# Patient Record
Sex: MALE | Race: BLACK OR AFRICAN AMERICAN | NOT HISPANIC OR LATINO | Employment: STUDENT | URBAN - METROPOLITAN AREA
[De-identification: names, ages, dates, MRNs, and addresses within clinical notes are randomized per-mention and may not be internally consistent; named-entity substitution may affect disease eponyms.]

---
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
## 2018-01-01 ENCOUNTER — APPOINTMENT (OUTPATIENT)
Dept: GENERAL RADIOLOGY | Facility: HOSPITAL | Age: 18
End: 2018-01-01

## 2018-01-01 ENCOUNTER — HOSPITAL ENCOUNTER (EMERGENCY)
Facility: HOSPITAL | Age: 18
Discharge: HOME | End: 2018-10-15
Attending: EMERGENCY MEDICINE | Admitting: NURSE PRACTITIONER

## 2018-01-01 ENCOUNTER — OFFICE VISIT (OUTPATIENT)
Dept: RETAIL CLINIC | Facility: CLINIC | Age: 18
End: 2018-01-01

## 2018-01-01 ENCOUNTER — TELEPHONE (OUTPATIENT)
Dept: URGENT CARE | Facility: CLINIC | Age: 18
End: 2018-01-01

## 2018-01-01 ENCOUNTER — HOSPITAL ENCOUNTER (EMERGENCY)
Facility: HOSPITAL | Age: 18
Discharge: HOME | End: 2018-07-08
Attending: EMERGENCY MEDICINE | Admitting: NURSE PRACTITIONER

## 2018-01-01 ENCOUNTER — OFFICE VISIT (OUTPATIENT)
Dept: CARDIOLOGY | Facility: CLINIC | Age: 18
End: 2018-01-01

## 2018-01-01 ENCOUNTER — HOSPITAL ENCOUNTER (EMERGENCY)
Facility: HOSPITAL | Age: 18
Discharge: HOME | End: 2018-12-18
Attending: EMERGENCY MEDICINE | Admitting: EMERGENCY MEDICINE

## 2018-01-01 ENCOUNTER — APPOINTMENT (OUTPATIENT)
Dept: CT IMAGING | Facility: HOSPITAL | Age: 18
End: 2018-01-01

## 2018-01-01 ENCOUNTER — HOSPITAL ENCOUNTER (EMERGENCY)
Facility: HOSPITAL | Age: 18
Discharge: HOME | End: 2018-07-24
Attending: EMERGENCY MEDICINE | Admitting: EMERGENCY MEDICINE

## 2018-01-01 VITALS
SYSTOLIC BLOOD PRESSURE: 132 MMHG | BODY MASS INDEX: 28.44 KG/M2 | DIASTOLIC BLOOD PRESSURE: 82 MMHG | OXYGEN SATURATION: 98 % | HEIGHT: 72 IN | HEART RATE: 58 BPM | TEMPERATURE: 98.5 F | RESPIRATION RATE: 16 BRPM | WEIGHT: 210 LBS

## 2018-01-01 VITALS
DIASTOLIC BLOOD PRESSURE: 80 MMHG | HEART RATE: 64 BPM | WEIGHT: 209.6 LBS | SYSTOLIC BLOOD PRESSURE: 110 MMHG | HEIGHT: 72 IN | BODY MASS INDEX: 28.39 KG/M2

## 2018-01-01 VITALS
DIASTOLIC BLOOD PRESSURE: 88 MMHG | HEART RATE: 78 BPM | SYSTOLIC BLOOD PRESSURE: 126 MMHG | OXYGEN SATURATION: 99 % | WEIGHT: 216 LBS | HEIGHT: 72 IN | TEMPERATURE: 98.3 F | BODY MASS INDEX: 29.26 KG/M2

## 2018-01-01 VITALS
BODY MASS INDEX: 28.44 KG/M2 | HEIGHT: 72 IN | TEMPERATURE: 97.4 F | WEIGHT: 210 LBS | DIASTOLIC BLOOD PRESSURE: 99 MMHG | HEART RATE: 53 BPM | SYSTOLIC BLOOD PRESSURE: 125 MMHG | RESPIRATION RATE: 16 BRPM | OXYGEN SATURATION: 94 %

## 2018-01-01 VITALS
HEIGHT: 71 IN | TEMPERATURE: 98.1 F | HEART RATE: 63 BPM | BODY MASS INDEX: 29.4 KG/M2 | RESPIRATION RATE: 18 BRPM | SYSTOLIC BLOOD PRESSURE: 142 MMHG | OXYGEN SATURATION: 100 % | WEIGHT: 210 LBS | DIASTOLIC BLOOD PRESSURE: 97 MMHG

## 2018-01-01 VITALS
DIASTOLIC BLOOD PRESSURE: 80 MMHG | HEART RATE: 55 BPM | WEIGHT: 210 LBS | RESPIRATION RATE: 16 BRPM | HEIGHT: 72 IN | OXYGEN SATURATION: 98 % | TEMPERATURE: 97.7 F | BODY MASS INDEX: 28.44 KG/M2 | SYSTOLIC BLOOD PRESSURE: 133 MMHG

## 2018-01-01 DIAGNOSIS — R55 NEAR SYNCOPE: ICD-10-CM

## 2018-01-01 DIAGNOSIS — M79.18 MUSCLE PAIN, MYOFACIAL: ICD-10-CM

## 2018-01-01 DIAGNOSIS — H60.332 ACUTE SWIMMER'S EAR OF LEFT SIDE: Primary | ICD-10-CM

## 2018-01-01 DIAGNOSIS — R77.8 TROPONIN LEVEL ELEVATED: ICD-10-CM

## 2018-01-01 DIAGNOSIS — R77.8 ELEVATED TROPONIN: ICD-10-CM

## 2018-01-01 DIAGNOSIS — R74.8 ELEVATED CPK: ICD-10-CM

## 2018-01-01 DIAGNOSIS — I51.4 CHRONIC MYOCARDITIS, UNSPECIFIED MYOCARDITIS TYPE (HCC): Primary | ICD-10-CM

## 2018-01-01 DIAGNOSIS — R07.9 CHEST PAIN, UNSPECIFIED TYPE: Primary | ICD-10-CM

## 2018-01-01 DIAGNOSIS — R07.89 ATYPICAL CHEST PAIN: Primary | ICD-10-CM

## 2018-01-01 DIAGNOSIS — I51.4 CHRONIC MYOCARDITIS, UNSPECIFIED MYOCARDITIS TYPE (HCC): ICD-10-CM

## 2018-01-01 DIAGNOSIS — R74.8 ELEVATED CK: ICD-10-CM

## 2018-01-01 DIAGNOSIS — R55 SYNCOPE AND COLLAPSE: ICD-10-CM

## 2018-01-01 LAB
ALBUMIN SERPL-MCNC: 4.38 G/DL (ref 3.2–4.8)
ALBUMIN SERPL-MCNC: 4.49 G/DL (ref 3.2–4.8)
ALBUMIN SERPL-MCNC: 4.6 G/DL (ref 3.2–4.8)
ALBUMIN/GLOB SERPL: 1.7 G/DL (ref 1.5–2.5)
ALBUMIN/GLOB SERPL: 1.7 G/DL (ref 1.5–2.5)
ALBUMIN/GLOB SERPL: 1.8 G/DL (ref 1.5–2.5)
ALP SERPL-CCNC: 143 U/L (ref 25–100)
ALP SERPL-CCNC: 148 U/L (ref 25–100)
ALP SERPL-CCNC: 153 U/L (ref 25–100)
ALT SERPL W P-5'-P-CCNC: 35 U/L (ref 7–40)
ALT SERPL W P-5'-P-CCNC: 66 U/L (ref 7–40)
ALT SERPL W P-5'-P-CCNC: 75 U/L (ref 7–40)
AMPHET+METHAMPHET UR QL: NEGATIVE
AMPHETAMINES UR QL: NEGATIVE
ANION GAP SERPL CALCULATED.3IONS-SCNC: 5 MMOL/L (ref 3–11)
ANION GAP SERPL CALCULATED.3IONS-SCNC: 5 MMOL/L (ref 3–11)
ANION GAP SERPL CALCULATED.3IONS-SCNC: 7 MMOL/L (ref 3–11)
AST SERPL-CCNC: 26 U/L (ref 0–33)
AST SERPL-CCNC: 33 U/L (ref 0–33)
AST SERPL-CCNC: 39 U/L (ref 0–33)
BARBITURATES UR QL SCN: NEGATIVE
BASOPHILS # BLD AUTO: 0.01 10*3/MM3 (ref 0–0.2)
BASOPHILS # BLD AUTO: 0.01 10*3/MM3 (ref 0–0.2)
BASOPHILS # BLD AUTO: 0.02 10*3/MM3 (ref 0–0.2)
BASOPHILS NFR BLD AUTO: 0.1 % (ref 0–1)
BASOPHILS NFR BLD AUTO: 0.2 % (ref 0–1)
BASOPHILS NFR BLD AUTO: 0.4 % (ref 0–1)
BENZODIAZ UR QL SCN: NEGATIVE
BILIRUB SERPL-MCNC: 0.5 MG/DL (ref 0.3–1.2)
BILIRUB SERPL-MCNC: 0.6 MG/DL (ref 0.3–1.2)
BILIRUB SERPL-MCNC: 0.7 MG/DL (ref 0.3–1.2)
BILIRUB UR QL STRIP: NEGATIVE
BNP SERPL-MCNC: <2 PG/ML (ref 0–100)
BUN BLD-MCNC: 10 MG/DL (ref 9–23)
BUN BLD-MCNC: 11 MG/DL (ref 9–23)
BUN BLD-MCNC: 11 MG/DL (ref 9–23)
BUN/CREAT SERPL: 10.6 (ref 7–25)
BUN/CREAT SERPL: 11.1 (ref 7–25)
BUN/CREAT SERPL: 8.8 (ref 7–25)
BUPRENORPHINE SERPL-MCNC: NEGATIVE NG/ML
CALCIUM SPEC-SCNC: 9.4 MG/DL (ref 8.7–10.4)
CALCIUM SPEC-SCNC: 9.7 MG/DL (ref 8.7–10.4)
CALCIUM SPEC-SCNC: 9.8 MG/DL (ref 8.7–10.4)
CANNABINOIDS SERPL QL: NEGATIVE
CHLORIDE SERPL-SCNC: 105 MMOL/L (ref 99–109)
CHLORIDE SERPL-SCNC: 107 MMOL/L (ref 99–109)
CHLORIDE SERPL-SCNC: 107 MMOL/L (ref 99–109)
CK SERPL-CCNC: 226 U/L (ref 26–174)
CK SERPL-CCNC: 297 U/L (ref 26–174)
CLARITY UR: CLEAR
CO2 SERPL-SCNC: 26 MMOL/L (ref 20–31)
CO2 SERPL-SCNC: 28 MMOL/L (ref 20–31)
CO2 SERPL-SCNC: 29 MMOL/L (ref 20–31)
COCAINE UR QL: NEGATIVE
COLOR UR: YELLOW
CREAT BLD-MCNC: 0.99 MG/DL (ref 0.6–1.3)
CREAT BLD-MCNC: 1.04 MG/DL (ref 0.6–1.3)
CREAT BLD-MCNC: 1.14 MG/DL (ref 0.6–1.3)
CRP SERPL-MCNC: 0.08 MG/DL (ref 0–1)
CRP SERPL-MCNC: 0.22 MG/DL (ref 0–1)
D DIMER PPP FEU-MCNC: 0.56 MG/L (FEU) (ref 0–0.5)
DEPRECATED RDW RBC AUTO: 38.2 FL (ref 37–54)
DEPRECATED RDW RBC AUTO: 38.4 FL (ref 37–54)
DEPRECATED RDW RBC AUTO: 38.7 FL (ref 37–54)
EOSINOPHIL # BLD AUTO: 0.04 10*3/MM3 (ref 0–0.3)
EOSINOPHIL # BLD AUTO: 0.06 10*3/MM3 (ref 0–0.3)
EOSINOPHIL # BLD AUTO: 0.06 10*3/MM3 (ref 0–0.3)
EOSINOPHIL NFR BLD AUTO: 0.5 % (ref 0–3)
EOSINOPHIL NFR BLD AUTO: 0.9 % (ref 0–3)
EOSINOPHIL NFR BLD AUTO: 1.3 % (ref 0–3)
ERYTHROCYTE [DISTWIDTH] IN BLOOD BY AUTOMATED COUNT: 12.8 % (ref 11.3–14.5)
ERYTHROCYTE [DISTWIDTH] IN BLOOD BY AUTOMATED COUNT: 12.8 % (ref 11.3–14.5)
ERYTHROCYTE [DISTWIDTH] IN BLOOD BY AUTOMATED COUNT: 12.9 % (ref 11.3–14.5)
ERYTHROCYTE [SEDIMENTATION RATE] IN BLOOD: 3 MM/HR (ref 0–15)
ERYTHROCYTE [SEDIMENTATION RATE] IN BLOOD: <1 MM/HR (ref 0–15)
GFR SERPL CREATININE-BSD FRML MDRD: 112 ML/MIN/1.73
GFR SERPL CREATININE-BSD FRML MDRD: 117 ML/MIN/1.73
GFR SERPL CREATININE-BSD FRML MDRD: 99 ML/MIN/1.73
GLOBULIN UR ELPH-MCNC: 2.5 GM/DL
GLOBULIN UR ELPH-MCNC: 2.6 GM/DL
GLOBULIN UR ELPH-MCNC: 2.7 GM/DL
GLUCOSE BLD-MCNC: 82 MG/DL (ref 70–100)
GLUCOSE BLD-MCNC: 87 MG/DL (ref 70–100)
GLUCOSE BLD-MCNC: 98 MG/DL (ref 70–100)
GLUCOSE UR STRIP-MCNC: NEGATIVE MG/DL
HCT VFR BLD AUTO: 42.5 % (ref 38.9–50.9)
HCT VFR BLD AUTO: 44.1 % (ref 38.9–50.9)
HCT VFR BLD AUTO: 45.5 % (ref 38.9–50.9)
HGB BLD-MCNC: 14.7 G/DL (ref 13.1–17.5)
HGB BLD-MCNC: 15 G/DL (ref 13.1–17.5)
HGB BLD-MCNC: 15.6 G/DL (ref 13.1–17.5)
HGB UR QL STRIP.AUTO: NEGATIVE
HIV1+2 AB SER QL: NORMAL
HOLD SPECIMEN: NORMAL
IMM GRANULOCYTES # BLD: 0 10*3/MM3 (ref 0–0.03)
IMM GRANULOCYTES # BLD: 0.01 10*3/MM3 (ref 0–0.03)
IMM GRANULOCYTES # BLD: 0.02 10*3/MM3 (ref 0–0.03)
IMM GRANULOCYTES NFR BLD: 0 % (ref 0–0.6)
IMM GRANULOCYTES NFR BLD: 0.2 % (ref 0–0.6)
IMM GRANULOCYTES NFR BLD: 0.3 % (ref 0–0.6)
KETONES UR QL STRIP: NEGATIVE
LEUKOCYTE ESTERASE UR QL STRIP.AUTO: NEGATIVE
LIPASE SERPL-CCNC: 29 U/L (ref 6–51)
LIPASE SERPL-CCNC: 30 U/L (ref 6–51)
LYMPHOCYTES # BLD AUTO: 1.41 10*3/MM3 (ref 0.6–4.8)
LYMPHOCYTES # BLD AUTO: 2.05 10*3/MM3 (ref 0.6–4.8)
LYMPHOCYTES # BLD AUTO: 2.27 10*3/MM3 (ref 0.6–4.8)
LYMPHOCYTES NFR BLD AUTO: 29.3 % (ref 24–44)
LYMPHOCYTES NFR BLD AUTO: 30.7 % (ref 24–44)
LYMPHOCYTES NFR BLD AUTO: 31.6 % (ref 24–44)
MCH RBC QN AUTO: 28.1 PG (ref 27–31)
MCHC RBC AUTO-ENTMCNC: 34 G/DL (ref 32–36)
MCHC RBC AUTO-ENTMCNC: 34.3 G/DL (ref 32–36)
MCHC RBC AUTO-ENTMCNC: 34.6 G/DL (ref 32–36)
MCV RBC AUTO: 81.1 FL (ref 80–99)
MCV RBC AUTO: 82 FL (ref 80–99)
MCV RBC AUTO: 82.6 FL (ref 80–99)
METHADONE UR QL SCN: NEGATIVE
MONOCYTES # BLD AUTO: 0.27 10*3/MM3 (ref 0–1)
MONOCYTES # BLD AUTO: 0.3 10*3/MM3 (ref 0–1)
MONOCYTES # BLD AUTO: 0.36 10*3/MM3 (ref 0–1)
MONOCYTES NFR BLD AUTO: 4.6 % (ref 0–12)
MONOCYTES NFR BLD AUTO: 4.6 % (ref 0–12)
MONOCYTES NFR BLD AUTO: 5.9 % (ref 0–12)
NEUTROPHILS # BLD AUTO: 2.83 10*3/MM3 (ref 1.5–8.3)
NEUTROPHILS # BLD AUTO: 4.06 10*3/MM3 (ref 1.5–8.3)
NEUTROPHILS # BLD AUTO: 5.08 10*3/MM3 (ref 1.5–8.3)
NEUTROPHILS NFR BLD AUTO: 61.5 % (ref 41–71)
NEUTROPHILS NFR BLD AUTO: 62.7 % (ref 41–71)
NEUTROPHILS NFR BLD AUTO: 65.5 % (ref 41–71)
NITRITE UR QL STRIP: NEGATIVE
OPIATES UR QL: NEGATIVE
OXYCODONE UR QL SCN: NEGATIVE
PCP UR QL SCN: NEGATIVE
PH UR STRIP.AUTO: 6.5 [PH] (ref 5–8)
PLATELET # BLD AUTO: 182 10*3/MM3 (ref 150–450)
PLATELET # BLD AUTO: 201 10*3/MM3 (ref 150–450)
PLATELET # BLD AUTO: 220 10*3/MM3 (ref 150–450)
PMV BLD AUTO: 8.6 FL (ref 6–12)
PMV BLD AUTO: 8.7 FL (ref 6–12)
PMV BLD AUTO: 8.9 FL (ref 6–12)
POTASSIUM BLD-SCNC: 3.5 MMOL/L (ref 3.5–5.5)
POTASSIUM BLD-SCNC: 3.7 MMOL/L (ref 3.5–5.5)
POTASSIUM BLD-SCNC: 4.1 MMOL/L (ref 3.5–5.5)
PROPOXYPH UR QL: NEGATIVE
PROT SERPL-MCNC: 7 G/DL (ref 5.7–8.2)
PROT SERPL-MCNC: 7.1 G/DL (ref 5.7–8.2)
PROT SERPL-MCNC: 7.2 G/DL (ref 5.7–8.2)
PROT UR QL STRIP: NEGATIVE
RBC # BLD AUTO: 5.24 10*6/MM3 (ref 4.2–5.76)
RBC # BLD AUTO: 5.34 10*6/MM3 (ref 4.2–5.76)
RBC # BLD AUTO: 5.55 10*6/MM3 (ref 4.2–5.76)
SODIUM BLD-SCNC: 139 MMOL/L (ref 132–146)
SODIUM BLD-SCNC: 140 MMOL/L (ref 132–146)
SODIUM BLD-SCNC: 140 MMOL/L (ref 132–146)
SP GR UR STRIP: 1.05 (ref 1–1.03)
TRICYCLICS UR QL SCN: NEGATIVE
TROPONIN I SERPL-MCNC: 0.34 NG/ML (ref 0–0.07)
TROPONIN I SERPL-MCNC: 0.34 NG/ML (ref 0–0.07)
TROPONIN I SERPL-MCNC: 0.38 NG/ML (ref 0–0.07)
TROPONIN I SERPL-MCNC: 0.41 NG/ML (ref 0–0.07)
TROPONIN I SERPL-MCNC: 0.42 NG/ML (ref 0–0.07)
TSH SERPL DL<=0.05 MIU/L-ACNC: 0.87 MIU/ML (ref 0.35–5.35)
UROBILINOGEN UR QL STRIP: ABNORMAL
WBC NRBC COR # BLD: 4.6 10*3/MM3 (ref 4.5–13.5)
WBC NRBC COR # BLD: 6.48 10*3/MM3 (ref 4.5–13.5)
WBC NRBC COR # BLD: 7.76 10*3/MM3 (ref 4.5–13.5)
WHOLE BLOOD HOLD SPECIMEN: NORMAL

## 2018-01-01 PROCEDURE — 85652 RBC SED RATE AUTOMATED: CPT | Performed by: NURSE PRACTITIONER

## 2018-01-01 PROCEDURE — 93005 ELECTROCARDIOGRAM TRACING: CPT | Performed by: EMERGENCY MEDICINE

## 2018-01-01 PROCEDURE — 83880 ASSAY OF NATRIURETIC PEPTIDE: CPT | Performed by: NURSE PRACTITIONER

## 2018-01-01 PROCEDURE — 25010000002 KETOROLAC TROMETHAMINE PER 15 MG: Performed by: EMERGENCY MEDICINE

## 2018-01-01 PROCEDURE — 99214 OFFICE O/P EST MOD 30 MIN: CPT | Performed by: INTERNAL MEDICINE

## 2018-01-01 PROCEDURE — 80306 DRUG TEST PRSMV INSTRMNT: CPT | Performed by: NURSE PRACTITIONER

## 2018-01-01 PROCEDURE — 99284 EMERGENCY DEPT VISIT MOD MDM: CPT

## 2018-01-01 PROCEDURE — 83690 ASSAY OF LIPASE: CPT | Performed by: EMERGENCY MEDICINE

## 2018-01-01 PROCEDURE — 86140 C-REACTIVE PROTEIN: CPT | Performed by: NURSE PRACTITIONER

## 2018-01-01 PROCEDURE — 82550 ASSAY OF CK (CPK): CPT | Performed by: EMERGENCY MEDICINE

## 2018-01-01 PROCEDURE — 71045 X-RAY EXAM CHEST 1 VIEW: CPT

## 2018-01-01 PROCEDURE — 80053 COMPREHEN METABOLIC PANEL: CPT | Performed by: NURSE PRACTITIONER

## 2018-01-01 PROCEDURE — 84443 ASSAY THYROID STIM HORMONE: CPT | Performed by: EMERGENCY MEDICINE

## 2018-01-01 PROCEDURE — 82550 ASSAY OF CK (CPK): CPT | Performed by: NURSE PRACTITIONER

## 2018-01-01 PROCEDURE — 81003 URINALYSIS AUTO W/O SCOPE: CPT | Performed by: NURSE PRACTITIONER

## 2018-01-01 PROCEDURE — 99213 OFFICE O/P EST LOW 20 MIN: CPT | Performed by: NURSE PRACTITIONER

## 2018-01-01 PROCEDURE — 84484 ASSAY OF TROPONIN QUANT: CPT

## 2018-01-01 PROCEDURE — 96372 THER/PROPH/DIAG INJ SC/IM: CPT

## 2018-01-01 PROCEDURE — 83880 ASSAY OF NATRIURETIC PEPTIDE: CPT | Performed by: EMERGENCY MEDICINE

## 2018-01-01 PROCEDURE — 71046 X-RAY EXAM CHEST 2 VIEWS: CPT

## 2018-01-01 PROCEDURE — 99283 EMERGENCY DEPT VISIT LOW MDM: CPT

## 2018-01-01 PROCEDURE — 0 IOPAMIDOL PER 1 ML: Performed by: EMERGENCY MEDICINE

## 2018-01-01 PROCEDURE — 25010000002 KETOROLAC TROMETHAMINE PER 15 MG: Performed by: NURSE PRACTITIONER

## 2018-01-01 PROCEDURE — 86140 C-REACTIVE PROTEIN: CPT | Performed by: EMERGENCY MEDICINE

## 2018-01-01 PROCEDURE — 80053 COMPREHEN METABOLIC PANEL: CPT | Performed by: EMERGENCY MEDICINE

## 2018-01-01 PROCEDURE — G0432 EIA HIV-1/HIV-2 SCREEN: HCPCS | Performed by: EMERGENCY MEDICINE

## 2018-01-01 PROCEDURE — 93005 ELECTROCARDIOGRAM TRACING: CPT

## 2018-01-01 PROCEDURE — 85652 RBC SED RATE AUTOMATED: CPT | Performed by: EMERGENCY MEDICINE

## 2018-01-01 PROCEDURE — 96361 HYDRATE IV INFUSION ADD-ON: CPT

## 2018-01-01 PROCEDURE — 85025 COMPLETE CBC W/AUTO DIFF WBC: CPT | Performed by: NURSE PRACTITIONER

## 2018-01-01 PROCEDURE — 85379 FIBRIN DEGRADATION QUANT: CPT | Performed by: NURSE PRACTITIONER

## 2018-01-01 PROCEDURE — 85025 COMPLETE CBC W/AUTO DIFF WBC: CPT | Performed by: EMERGENCY MEDICINE

## 2018-01-01 PROCEDURE — 71275 CT ANGIOGRAPHY CHEST: CPT

## 2018-01-01 PROCEDURE — 96374 THER/PROPH/DIAG INJ IV PUSH: CPT

## 2018-01-01 RX ORDER — CYCLOBENZAPRINE HCL 10 MG
10 TABLET ORAL 3 TIMES DAILY PRN
Qty: 15 TABLET | Refills: 0 | Status: SHIPPED | OUTPATIENT
Start: 2018-01-01 | End: 2018-01-01

## 2018-01-01 RX ORDER — ASPIRIN 81 MG/1
324 TABLET, CHEWABLE ORAL ONCE
Status: COMPLETED | OUTPATIENT
Start: 2018-01-01 | End: 2018-01-01

## 2018-01-01 RX ORDER — SODIUM CHLORIDE 0.9 % (FLUSH) 0.9 %
10 SYRINGE (ML) INJECTION AS NEEDED
Status: DISCONTINUED | OUTPATIENT
Start: 2018-01-01 | End: 2018-01-01 | Stop reason: HOSPADM

## 2018-01-01 RX ORDER — KETOROLAC TROMETHAMINE 15 MG/ML
10 INJECTION, SOLUTION INTRAMUSCULAR; INTRAVENOUS ONCE
Status: COMPLETED | OUTPATIENT
Start: 2018-01-01 | End: 2018-01-01

## 2018-01-01 RX ORDER — KETOROLAC TROMETHAMINE 30 MG/ML
60 INJECTION, SOLUTION INTRAMUSCULAR; INTRAVENOUS ONCE
Status: COMPLETED | OUTPATIENT
Start: 2018-01-01 | End: 2018-01-01

## 2018-01-01 RX ORDER — KETOROLAC TROMETHAMINE 30 MG/ML
30 INJECTION, SOLUTION INTRAMUSCULAR; INTRAVENOUS ONCE
Status: COMPLETED | OUTPATIENT
Start: 2018-01-01 | End: 2018-01-01

## 2018-01-01 RX ORDER — DICLOFENAC SODIUM 75 MG/1
75 TABLET, DELAYED RELEASE ORAL 2 TIMES DAILY
Qty: 14 TABLET | Refills: 0 | Status: SHIPPED | OUTPATIENT
Start: 2018-01-01

## 2018-01-01 RX ORDER — ASPIRIN 81 MG/1
324 TABLET, CHEWABLE ORAL ONCE
Status: DISCONTINUED | OUTPATIENT
Start: 2018-01-01 | End: 2018-01-01

## 2018-01-01 RX ADMIN — SODIUM CHLORIDE 1000 ML: 9 INJECTION, SOLUTION INTRAVENOUS at 21:37

## 2018-01-01 RX ADMIN — KETOROLAC TROMETHAMINE 60 MG: 30 INJECTION, SOLUTION INTRAMUSCULAR at 12:29

## 2018-01-01 RX ADMIN — ASPIRIN 81 MG 324 MG: 81 TABLET ORAL at 10:52

## 2018-01-01 RX ADMIN — KETOROLAC TROMETHAMINE 10 MG: 15 INJECTION, SOLUTION INTRAMUSCULAR; INTRAVENOUS at 10:52

## 2018-01-01 RX ADMIN — KETOROLAC TROMETHAMINE 30 MG: 30 INJECTION, SOLUTION INTRAMUSCULAR at 21:39

## 2018-01-01 RX ADMIN — IOPAMIDOL 80 ML: 755 INJECTION, SOLUTION INTRAVENOUS at 21:30

## 2018-02-06 ENCOUNTER — TELEPHONE (OUTPATIENT)
Dept: INTERNAL MEDICINE | Facility: CLINIC | Age: 18
End: 2018-02-06

## 2018-02-06 NOTE — TELEPHONE ENCOUNTER
----- Message from Bronwyn Cartwright sent at 2/6/2018 11:18 AM EST -----  [phone]    PT WANTS THE NAME OF THE CARDIOLOGOST THAT YOU ALL DISCUSSED A COUPLE MONTHS AGO.

## 2018-03-03 ENCOUNTER — HOSPITAL ENCOUNTER (EMERGENCY)
Facility: HOSPITAL | Age: 18
Discharge: HOME | End: 2018-03-04
Attending: EMERGENCY MEDICINE | Admitting: EMERGENCY MEDICINE

## 2018-03-03 ENCOUNTER — APPOINTMENT (OUTPATIENT)
Dept: GENERAL RADIOLOGY | Facility: HOSPITAL | Age: 18
End: 2018-03-03

## 2018-03-03 DIAGNOSIS — R42 DIZZINESS: ICD-10-CM

## 2018-03-03 DIAGNOSIS — R07.89 ATYPICAL CHEST PAIN: Primary | ICD-10-CM

## 2018-03-03 LAB
BUN BLDA-MCNC: 7 MG/DL (ref 8–26)
CA-I BLDA-SCNC: 1.22 MMOL/L (ref 1.2–1.32)
CHLORIDE BLDA-SCNC: 102 MMOL/L (ref 98–109)
CO2 BLDA-SCNC: 25 MMOL/L (ref 24–29)
CREAT BLDA-MCNC: 0.9 MG/DL (ref 0.6–1.3)
GLUCOSE BLDC GLUCOMTR-MCNC: 92 MG/DL (ref 70–130)
HCT VFR BLDA CALC: 44 % (ref 38–51)
HGB BLDA-MCNC: 15 G/DL (ref 12–17)
POTASSIUM BLDA-SCNC: 3.8 MMOL/L (ref 3.5–4.9)
SODIUM BLDA-SCNC: 139 MMOL/L (ref 138–146)

## 2018-03-03 PROCEDURE — 85014 HEMATOCRIT: CPT

## 2018-03-03 PROCEDURE — 80047 BASIC METABLC PNL IONIZED CA: CPT

## 2018-03-03 PROCEDURE — 71045 X-RAY EXAM CHEST 1 VIEW: CPT

## 2018-03-03 PROCEDURE — 99283 EMERGENCY DEPT VISIT LOW MDM: CPT

## 2018-03-03 PROCEDURE — 93005 ELECTROCARDIOGRAM TRACING: CPT | Performed by: EMERGENCY MEDICINE

## 2018-03-03 RX ORDER — NAPROXEN 250 MG/1
500 TABLET ORAL ONCE
Status: COMPLETED | OUTPATIENT
Start: 2018-03-03 | End: 2018-03-03

## 2018-03-03 RX ADMIN — NAPROXEN 500 MG: 250 TABLET ORAL at 23:10

## 2018-03-04 VITALS
BODY MASS INDEX: 27.77 KG/M2 | SYSTOLIC BLOOD PRESSURE: 127 MMHG | TEMPERATURE: 98.5 F | HEART RATE: 55 BPM | OXYGEN SATURATION: 97 % | DIASTOLIC BLOOD PRESSURE: 84 MMHG | WEIGHT: 205 LBS | HEIGHT: 72 IN | RESPIRATION RATE: 20 BRPM

## 2018-03-04 NOTE — DISCHARGE INSTRUCTIONS
Follow up with your primary care physician in one week.    Immediately return to the emergency department for any new or worsening symptoms.

## 2018-03-05 ENCOUNTER — TELEPHONE (OUTPATIENT)
Dept: INTERNAL MEDICINE | Facility: CLINIC | Age: 18
End: 2018-03-05

## 2018-03-05 NOTE — TELEPHONE ENCOUNTER
----- Message from Jess Brannon sent at 3/5/2018  1:52 PM EST -----  Patient was in the ER yesterday for chest pain and they are wanting him to follow up with PCP and he's needing to be worked in.    Please call patient: [phone]    Thank you.

## 2018-03-07 ENCOUNTER — OFFICE VISIT (OUTPATIENT)
Dept: INTERNAL MEDICINE | Facility: CLINIC | Age: 18
End: 2018-03-07

## 2018-03-07 VITALS
TEMPERATURE: 98.1 F | RESPIRATION RATE: 18 BRPM | SYSTOLIC BLOOD PRESSURE: 110 MMHG | WEIGHT: 212.4 LBS | DIASTOLIC BLOOD PRESSURE: 70 MMHG | HEART RATE: 70 BPM | BODY MASS INDEX: 28.81 KG/M2

## 2018-03-07 DIAGNOSIS — R07.9 CHEST PAIN, UNSPECIFIED TYPE: Primary | ICD-10-CM

## 2018-03-07 DIAGNOSIS — R09.1 PLEURITIS: ICD-10-CM

## 2018-03-07 DIAGNOSIS — R07.89 ATYPICAL CHEST PAIN: ICD-10-CM

## 2018-03-07 PROCEDURE — 99213 OFFICE O/P EST LOW 20 MIN: CPT | Performed by: INTERNAL MEDICINE

## 2018-04-24 ENCOUNTER — OFFICE VISIT (OUTPATIENT)
Dept: PULMONOLOGY | Facility: CLINIC | Age: 18
End: 2018-04-24

## 2018-04-24 VITALS
RESPIRATION RATE: 16 BRPM | HEART RATE: 64 BPM | SYSTOLIC BLOOD PRESSURE: 148 MMHG | WEIGHT: 215 LBS | OXYGEN SATURATION: 97 % | DIASTOLIC BLOOD PRESSURE: 88 MMHG | HEIGHT: 71 IN | TEMPERATURE: 97.1 F | BODY MASS INDEX: 30.1 KG/M2

## 2018-04-24 DIAGNOSIS — R07.89 ATYPICAL CHEST PAIN: ICD-10-CM

## 2018-04-24 DIAGNOSIS — R06.02 SHORTNESS OF BREATH: Primary | ICD-10-CM

## 2018-04-24 PROCEDURE — 94375 RESPIRATORY FLOW VOLUME LOOP: CPT | Performed by: INTERNAL MEDICINE

## 2018-04-24 PROCEDURE — 99203 OFFICE O/P NEW LOW 30 MIN: CPT | Performed by: INTERNAL MEDICINE

## 2018-04-24 PROCEDURE — 94726 PLETHYSMOGRAPHY LUNG VOLUMES: CPT | Performed by: INTERNAL MEDICINE

## 2018-04-24 PROCEDURE — 94729 DIFFUSING CAPACITY: CPT | Performed by: INTERNAL MEDICINE

## 2018-04-24 NOTE — PROGRESS NOTES
Pool Yang is a 17 y.o. male here for evaluation of   Chief Complaint   Patient presents with   • Chest Pain       Problem list:  1. Atypical chest pain  2. History of syncope/vasovagal/associated with one seizure  3. Migraine headaches without aura  4. Hernia repair  5. No drug allergies    History of Present Illness    17-year-old -American gentleman here for evaluation of chest pain. He reports episodic chest pain since he was 4 years of age. It can be sharp or dull. It is usually anterior but can be left or right or central. It can be sharp and hurt with breathing. But it can also feel like a pressure at times. It will last anywhere from a few minutes to an hour and it is severe, 10 out of 10. If he stops activity and rest it does not helps. Ibuprofen typically does not help. Onset can occur with activity but also at rest. It does not appear to be related to meals. It will just eventually go away. He has never had a car wreck or significant chest trauma or fractures. Nothing seems to make it better. 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 He was in the emergency room in November 2017 and March 2018 with chest pain. EKG showed early repolarization. Chest x-ray was clear. CTA of the chest showed no pulmonary emboli. He takes no medications. He had an extensive cardiac evaluation including echocardiogram, stress echocardiogram, CT cardiac with 3-D imaging.    Review of Systems   Constitutional: Negative for activity change, appetite change and fatigue.   HENT: Negative for congestion, postnasal drip and sore throat.    Eyes: Negative for visual disturbance.   Respiratory: Positive for chest tightness. Negative for cough and wheezing.    Cardiovascular: Positive for chest pain. Negative for palpitations.   Gastrointestinal: Positive for diarrhea. Negative for abdominal pain.   Endocrine: Negative.    Genitourinary: Negative.   "  Musculoskeletal: Negative for arthralgias.   Skin: Negative for rash.   Allergic/Immunologic: Negative for environmental allergies.   Neurological: Negative for dizziness, syncope and headaches.   Hematological: Negative.    Psychiatric/Behavioral: Negative.        No current outpatient prescriptions on file.    Past Medical History:   Diagnosis Date   • ADHD (attention deficit hyperactivity disorder)    • Hernia of abdominal wall    • Hypertension    • Seizures    • Syncope      Past Surgical History:   Procedure Laterality Date   • HERNIA REPAIR       Social History     Social History   • Marital status: Single     Social History Main Topics   • Smoking status: Never Smoker   • Smokeless tobacco: Never Used   • Alcohol use No   • Drug use: No   • Sexual activity: Defer     Other Topics Concern   • Not on file     Social History Narrative    [employer]     Family History   Problem Relation Age of Onset   • Arthritis Mother    • Other Mother      cardiac disorder   • Depression Mother    • Diabetes Mother    • Hypertension Mother    • Hyperlipidemia Mother    • Thyroid disease Mother    • Cancer Maternal Grandmother    • Cancer Maternal Grandfather    • Cancer Paternal Grandmother    • Cancer Paternal Grandfather      Blood pressure 148/88, pulse 64, temperature 97.1 °F (36.2 °C), resp. rate 16, height 180.3 cm (71\"), weight 97.5 kg (215 lb), SpO2 97 %.    Physical Exam   Constitutional: He is oriented to person, place, and time. He appears well-developed and well-nourished. No distress.   HENT:   Head: Normocephalic and atraumatic.   Mouth/Throat: Oropharynx is clear and moist. No oropharyngeal exudate.   Mild turbinate enlargement, septum midline   Eyes: EOM are normal. Pupils are equal, round, and reactive to light. No scleral icterus.   Neck: No JVD present. No tracheal deviation present. No thyromegaly present.   Cardiovascular: Normal rate, regular rhythm and normal heart sounds.    No murmur " heard.  Pulmonary/Chest: Effort normal and breath sounds normal. No respiratory distress. He has no wheezes.   Abdominal: Soft. Bowel sounds are normal. He exhibits no distension. There is no tenderness.   Musculoskeletal: He exhibits no edema.   Lymphadenopathy:     He has no cervical adenopathy.   Neurological: He is alert and oriented to person, place, and time.   Skin: Skin is warm and dry. No rash noted.   Psychiatric: He has a normal mood and affect.         PFTs: FVC 4.53 L, 96%, FEV1 3.71 L, 92% ratio 82%, flow volume loop normal, total lung capacity 5.62 L, 75%, diffusion capacity 28.4, 79%, no obstruction mild decreased total lung capacity secondary to poor seal, normal diffusion capacity    Radiology:  IMPRESSION:  Normal coronary CT angiogram without calcified or soft  tissue plaque or stenosis. No CTA findings are identified to explain the  patient's symptoms.     D:  11/30/2017     FINDINGS:  Pulmonary arteries:  Normal.  Aorta:  No acute findings.  Lungs:  Normal.  Pleural spaces:  Normal.  Heart:  Normal.  Bones:  Normal.  Lymph nodes:  Normal.     IMPRESSION:     Normal chest.  No pulmonary embolism.    Interpretation Summary     · Left ventricular function is normal. Estimated EF = 55%.  · Mild pulmonic valve regurgitation is present.  · RVSP(TR) 21.6 mmHg     A stress test was performed following the Fazal protocol.   The patient achieved the target heart rate. The test was stopped because the patient complained of fatigue.  The patient reported no symptoms during the stress test. The patient experienced no angina during the stress test.   low risk for ischemic heart disease.   Blood pressure and heart rate demonstrated a normal response to exercise. Overall, the patient's exercise capacity was normal.  Lab:    Pool was seen today for chest pain.    Diagnoses and all orders for this visit:    Shortness of breath  -     Pulmonary Function Test    Atypical chest pain        Discussion: Wonderful  17-year-old gentleman with a long history of intermittent chest pain that is quite severe in nature. It can last anywhere from minutes to an hour and is described as 10 out of 10. Cardiac evaluation was negative. CT scan shows no pulmonary emboli, pleural effusions, infiltrates. He has had no previous history of chest trauma. Does not appear to be related to food so I do not think esophageal spasm is causing his symptoms. Pulmonary function tests are normal. He has no symptoms that would suggest lupus or rheumatoid arthritis. In 2016 his sedimentation rate was 1. I cannot find a respiratory etiology for his chest pain.      Samara Najera MD

## 2018-07-09 NOTE — DISCHARGE INSTRUCTIONS
Please call Dr. Chavez's office tomorrow morning for a follow up as soon as possible.    Return to the ED immediately for any worsening or concerning symptoms.

## 2018-07-09 NOTE — ED PROVIDER NOTES
"Subjective   Mr. Pool Yang is a 17 year old male who presents to the ED with c/o chest pain and near-syncope. EMS personnel report that patient was at [employer] this evening worshipping when he suddenly got hot and diaphoretic. He tried walking to the water fountain but became dizzy and fell to the ground. Patient's friend at bedside states he lie on the ground for several minutes \"staring straight forward and not responding\" before EMS were able to arrive. At the scene, EMS personnel state patient looked fatigued but was responsive to questioning. In the ambulance, his blood pressure remained high and he remained in sinus arrhythmia. While he initially denied any pain in the truck, by the time they had reached the hospital the patient began complaining of pain in his left chest, worsened especially with deep breathing. In the emergency room, patient continues to endorse \"10/10\" stabbing pain in his left chest. He denies any associated fevers, chills, nausea, or vomiting. He denies any changes to his appetite. He notes that he was feeling normal earlier today. Had some mild abdominal pain this morning relieved by a normal bowel movement.    Of note, patient has a cardiac history. He has intermittently been experiencing pains in his left chest for the past 13 years. He has had extensive testing, however laments that his results have been inconclusive. He has been on medication in the past (unable to recall the name in the emergency department), but reports that it did not help and he stopped taking it a while ago.        History provided by:  Patient and EMS personnel  Chest Pain   Pain location:  L chest  Pain quality: stabbing    Pain severity:  Severe (\"10/10\")  Onset quality:  Sudden  Timing:  Constant  Progression:  Unchanged  Relieved by:  Nothing  Worsened by:  Nothing  Associated symptoms: abdominal pain, diaphoresis and near-syncope    Associated symptoms: no nausea and no vomiting    Abdominal pain: "     Quality: cramping      Severity:  Mild    Progression:  Resolved  Risk factors: hypertension and male sex    Risk factors: no smoking        Review of Systems   Constitutional: Positive for diaphoresis.   Cardiovascular: Positive for chest pain and near-syncope.   Gastrointestinal: Positive for abdominal pain. Negative for blood in stool, constipation, diarrhea, nausea and vomiting.   Genitourinary: Negative.  Negative for decreased urine volume, difficulty urinating, dysuria, frequency, hematuria and urgency.   All other systems reviewed and are negative.      Past Medical History:   Diagnosis Date   • ADHD (attention deficit hyperactivity disorder)    • Hernia of abdominal wall    • Hypertension    • Seizures (CMS/HCC)    • Syncope        No Known Allergies    Past Surgical History:   Procedure Laterality Date   • HERNIA REPAIR         Family History   Problem Relation Age of Onset   • Arthritis Mother    • Other Mother         cardiac disorder   • Depression Mother    • Diabetes Mother    • Hypertension Mother    • Hyperlipidemia Mother    • Thyroid disease Mother    • Cancer Maternal Grandmother    • Cancer Maternal Grandfather    • Cancer Paternal Grandmother    • Cancer Paternal Grandfather        Social History     Social History   • Marital status: Single     Social History Main Topics   • Smoking status: Never Smoker   • Smokeless tobacco: Never Used   • Alcohol use No   • Drug use: No   • Sexual activity: Defer     Other Topics Concern   • Not on file     Social History Narrative    [facility]         Objective   Physical Exam   Constitutional: He is oriented to person, place, and time. He appears well-developed and well-nourished. No distress.   HENT:   Head: Normocephalic and atraumatic.   Eyes: Conjunctivae are normal. No scleral icterus.   Neck: Normal range of motion. Neck supple. No JVD present.   No JVD.   Cardiovascular: Normal rate, regular rhythm, normal heart sounds and intact  distal pulses.  Exam reveals no gallop and no friction rub.    No murmur heard.  Heart rate is in the 60s, regular rhythm. No murmur, no rubs.   Pulmonary/Chest: Effort normal and breath sounds normal. No respiratory distress. He has no wheezes. He has no rales. He exhibits no tenderness.   I am not able to reproduce the patient's pain with palpation. His pain is not positional.   Abdominal: Soft. Bowel sounds are normal. There is no tenderness. There is no guarding.   Musculoskeletal: Normal range of motion.   Neurological: He is alert and oriented to person, place, and time.   Skin: Skin is warm and dry. He is not diaphoretic.   Skin is warm and dry.   Psychiatric: He has a normal mood and affect. His behavior is normal.   Nursing note and vitals reviewed.      Procedures         ED Course  ED Course as of Jul 08 2316   Sun Jul 08, 2018 2030 Troponin I: (!) 0.34 [ML]   2044 D-dimer, Quant: (!) 0.56 [ML]   2312 Spoke with Dr. Chavez, on-call cardiology, who wishes patient to call the office tomorrow morning for prompt follow up. -ML  [MU]      ED Course User Index  [ML] Ginette Morales APRN  [MU] Chetan Zabala       Recent Results (from the past 24 hour(s))   Comprehensive Metabolic Panel    Collection Time: 07/08/18  8:04 PM   Result Value Ref Range    Glucose 87 70 - 100 mg/dL    BUN 11 9 - 23 mg/dL    Creatinine 1.04 0.60 - 1.30 mg/dL    Sodium 140 132 - 146 mmol/L    Potassium 3.5 3.5 - 5.5 mmol/L    Chloride 107 99 - 109 mmol/L    CO2 26.0 20.0 - 31.0 mmol/L    Calcium 9.4 8.7 - 10.4 mg/dL    Total Protein 7.0 5.7 - 8.2 g/dL    Albumin 4.38 3.20 - 4.80 g/dL    ALT (SGPT) 75 (H) 7 - 40 U/L    AST (SGOT) 39 (H) 0 - 33 U/L    Alkaline Phosphatase 153 (H) 25 - 100 U/L    Total Bilirubin 0.5 0.3 - 1.2 mg/dL    eGFR  African Amer 112 >60 mL/min/1.73    Globulin 2.6 gm/dL    A/G Ratio 1.7 1.5 - 2.5 g/dL    BUN/Creatinine Ratio 10.6 7.0 - 25.0    Anion Gap 7.0 3.0 - 11.0 mmol/L   Lipase    Collection Time:  07/08/18  8:04 PM   Result Value Ref Range    Lipase 30 6 - 51 U/L   BNP    Collection Time: 07/08/18  8:04 PM   Result Value Ref Range    BNP <2.0 0.0 - 100.0 pg/mL   Green Top (Gel)    Collection Time: 07/08/18  8:04 PM   Result Value Ref Range    Extra Tube Hold for add-ons.    Lavender Top    Collection Time: 07/08/18  8:04 PM   Result Value Ref Range    Extra Tube hold for add-on    Gold Top - SST    Collection Time: 07/08/18  8:04 PM   Result Value Ref Range    Extra Tube Hold for add-ons.    CBC Auto Differential    Collection Time: 07/08/18  8:04 PM   Result Value Ref Range    WBC 6.48 4.50 - 13.50 10*3/mm3    RBC 5.24 4.20 - 5.76 10*6/mm3    Hemoglobin 14.7 13.1 - 17.5 g/dL    Hematocrit 42.5 38.9 - 50.9 %    MCV 81.1 80.0 - 99.0 fL    MCH 28.1 27.0 - 31.0 pg    MCHC 34.6 32.0 - 36.0 g/dL    RDW 12.8 11.3 - 14.5 %    RDW-SD 38.2 37.0 - 54.0 fl    MPV 8.6 6.0 - 12.0 fL    Platelets 201 150 - 450 10*3/mm3    Neutrophil % 62.7 41.0 - 71.0 %    Lymphocyte % 31.6 24.0 - 44.0 %    Monocyte % 4.6 0.0 - 12.0 %    Eosinophil % 0.9 0.0 - 3.0 %    Basophil % 0.2 0.0 - 1.0 %    Immature Grans % 0.0 0.0 - 0.6 %    Neutrophils, Absolute 4.06 1.50 - 8.30 10*3/mm3    Lymphocytes, Absolute 2.05 0.60 - 4.80 10*3/mm3    Monocytes, Absolute 0.30 0.00 - 1.00 10*3/mm3    Eosinophils, Absolute 0.06 0.00 - 0.30 10*3/mm3    Basophils, Absolute 0.01 0.00 - 0.20 10*3/mm3    Immature Grans, Absolute 0.00 0.00 - 0.03 10*3/mm3   Light Blue Top    Collection Time: 07/08/18  8:05 PM   Result Value Ref Range    Extra Tube hold for add-on    D-dimer, Quantitative    Collection Time: 07/08/18  8:05 PM   Result Value Ref Range    D-Dimer, Quantitative 0.56 (H) 0.00 - 0.50 mg/L (FEU)   POC Troponin, Rapid    Collection Time: 07/08/18  8:10 PM   Result Value Ref Range    Troponin I 0.34 (H) 0.00 - 0.07 ng/mL   Urine Drug Screen - Urine, Clean Catch    Collection Time: 07/08/18  9:52 PM   Result Value Ref Range    THC, Screen, Urine Negative  Negative    Phencyclidine (PCP), Urine Negative Negative    Cocaine Screen, Urine Negative Negative    Methamphetamine, Urine Negative Negative    Opiate Screen Negative Negative    Amphetamine Screen, Urine Negative Negative    Benzodiazepine Screen, Urine Negative Negative    Tricyclic Antidepressants Screen Negative Negative    Methadone Screen, Urine Negative Negative    Barbiturates Screen, Urine Negative Negative    Oxycodone Screen, Urine Negative Negative    Propoxyphene Screen Negative Negative    Buprenorphine, Screen, Urine Negative Negative   Urinalysis With Microscopic If Indicated (No Culture) - Urine, Clean Catch    Collection Time: 07/08/18  9:52 PM   Result Value Ref Range    Color, UA Yellow Yellow, Straw    Appearance, UA Clear Clear    pH, UA 6.5 5.0 - 8.0    Specific Gravity, UA 1.051 (H) 1.001 - 1.030    Glucose, UA Negative Negative    Ketones, UA Negative Negative    Bilirubin, UA Negative Negative    Blood, UA Negative Negative    Protein, UA Negative Negative    Leuk Esterase, UA Negative Negative    Nitrite, UA Negative Negative    Urobilinogen, UA 1.0 E.U./dL 0.2 - 1.0 E.U./dL     Note: In addition to lab results from this visit, the labs listed above may include labs taken at another facility or during a different encounter within the last 24 hours. Please correlate lab times with ED admission and discharge times for further clarification of the services performed during this visit.    CT Angiogram Chest With & Without Contrast   Final Result      Normal chest CTA.  No pulmonary embolism.      THIS DOCUMENT HAS BEEN ELECTRONICALLY SIGNED BY ARJUN MAK MD      XR Chest 1 View   Final Result   No acute disease process is seen in the chest.      Other findings as above.          THIS DOCUMENT HAS BEEN ELECTRONICALLY SIGNED BY SUSANA LONG MD        Vitals:    07/08/18 2100 07/08/18 2115 07/08/18 2245 07/08/18 2300   BP: 142/83 142/95 132/93    BP Location:       Patient Position:        Pulse: 56 59 57 55   Resp:       Temp:       TempSrc:       SpO2: 100% 98% 98% 98%   Weight:       Height:         Medications   sodium chloride 0.9 % flush 10 mL (not administered)   iopamidol (ISOVUE-370) 76 % injection 100 mL (80 mL Intravenous Given 7/8/18 2130)     ECG/EMG Results (last 24 hours)     Procedure Component Value Units Date/Time    ECG 12 Lead [[id number]] Collected:  07/08/18 1955     Updated:  07/08/18 1956                HEART Score (for prediction of 6-week risk of major adverse cardiac event) reviewed and/or performed as part of the patient evaluation and treatment planning process.  The result associated with this review/performance is: 1           MDM    Final diagnoses:   Atypical chest pain   Near syncope   Elevated troponin       Documentation assistance provided by gay Zabala.  Information recorded by the scribrené was done at my direction and has been verified and validated by me.     Chetan Zabala  07/08/18 2052     Chetan Zabala  07/08/18 2304       PONCHO Velez  07/08/18 3660

## 2018-07-10 NOTE — ASSESSMENT & PLAN NOTE
This patient has been intriguing and difficult to diagnose over the last several years. He continues to have nearly daily atypical chest pain symptoms and on occasion exertional shortness of breath and near syncope. His most recent ER visit demonstrated mild troponin elevation in the setting of normal coronary arteries from a CT angiogram performed last year. I am suspicious of myocardial inflammation as the etiology to his symptoms, although I have had no other objective findings such as diffuse ST elevation on EKG or pericardial rub. I believe this patient deserves cardiac MR evaluation for myocardial inflammation/ischemia.

## 2018-07-10 NOTE — PROGRESS NOTES
Encounter Date:07/10/2018    Patient ID: Pool Yang is a 17 y.o. male who resides in [place], [place].    CC/Reason for visit:  Chest Pain; Elevated troponin levels; and Shortness of Breath            Pool Yang returns to my office today in follow-up of recurrent chest pain and syncope. The patient was in the emergency room on 7/8/18 with a recurrence of chest pain and syncope. The patient had been active earlier that day. He was at [employer] and they were jumping around when he became lightheaded and sat down. When people came to tend to him, his eyes were focused straight ahead and he would not respond for a short period of time i.e. 5 seconds. EMS was called and nitroglycerin was administered which did not help any symptoms of chest pain. In the emergency room, troponin came back marginally elevated. D-dimer also came back marginally elevated. CT chest ruled out pulmonary embolism.    Patient states that he has recently taken a summer job at [email or web address]. He helps move large stone countertops around and has no chest pain related to this. He states that he does have sharp chest pains on a daily basis which are not exertional in nature. He states that these syncopal episodes happen less frequently. He relates to me that in 2016 he sought a second opinion at [facility] regarding his condition. It sounds as though he wore a Holter monitor and underwent an echocardiogram. No one ever corresponded with him regarding the results of his studies nor was a follow-up scheduled. He states that nitroglycerin has not relieved these chest pains in the past. He is also been prescribed high dose nonsteroidal anti-inflammatory medication which also has not seemed to help.      Review of Systems   Constitution: Negative for weakness and malaise/fatigue.   Eyes: Positive for blurred vision. Negative for vision loss in left eye and vision loss in right eye.   Cardiovascular: Positive for chest pain.  "Negative for dyspnea on exertion, near-syncope, orthopnea, palpitations, paroxysmal nocturnal dyspnea and syncope.   Respiratory: Positive for shortness of breath.    Musculoskeletal: Positive for muscle weakness. Negative for myalgias.   Gastrointestinal: Positive for constipation and diarrhea.   Neurological: Positive for difficulty with concentration, dizziness, focal weakness, headaches and loss of balance. Negative for brief paralysis, excessive daytime sleepiness, numbness and paresthesias.   Psychiatric/Behavioral: Positive for altered mental status.   All other systems reviewed and are negative.      The patient's past medical, social, family history and ROS reviewed in the patient's electronic medical record.    Allergies  Patient has no known allergies.    No outpatient prescriptions have been marked as taking for the 7/10/18 encounter (Office Visit) with Marcus Bell IV, MD.         Blood pressure 110/80, pulse 64, height 182.9 cm (72\"), weight 95.1 kg (209 lb 9.6 oz).  Body mass index is 28.43 kg/m².  There were no vitals filed for this visit.    Physical Exam   Constitutional: He is oriented to person, place, and time. He appears well-developed and well-nourished.   HENT:   Head: Normocephalic and atraumatic.   Eyes: Pupils are equal, round, and reactive to light. No scleral icterus.   Neck: No JVD present. Carotid bruit is not present. No thyromegaly present.   Cardiovascular: Normal rate, regular rhythm, S1 normal and S2 normal.  Exam reveals no gallop.    No murmur heard.  Pulmonary/Chest: Effort normal and breath sounds normal.   Abdominal: Soft. There is no hepatosplenomegaly. There is no tenderness.   Neurological: He is alert and oriented to person, place, and time.   Skin: Skin is warm and dry. No cyanosis. Nails show no clubbing.   Psychiatric: He has a normal mood and affect. His behavior is normal.       Data Review:     Procedures    Lab Results   Component Value Date    AST 39 " (H) 07/08/2018    ALT 75 (H) 07/08/2018     Lab Results   Component Value Date    GLUCOSE 87 07/08/2018    BUN 11 07/08/2018    CREATININE 1.04 07/08/2018    EGFRIFNONA  11/28/2016      Comment:      Unable to calculate GFR, patient age <=18.    EGFRIFAFRI 112 07/08/2018    BCR 10.6 07/08/2018    K 3.5 07/08/2018    CO2 26.0 07/08/2018    CALCIUM 9.4 07/08/2018    ALBUMIN 4.38 07/08/2018    LABIL2 1.7 07/08/2018    AST 39 (H) 07/08/2018    ALT 75 (H) 07/08/2018     Lab Results   Component Value Date    WBC 6.48 07/08/2018    HGB 14.7 07/08/2018    HCT 42.5 07/08/2018    MCV 81.1 07/08/2018     07/08/2018        Problem List Items Addressed This Visit        Cardiovascular and Mediastinum    Chronic myocarditis (CMS/HCC) - Primary    Overview     · Multiple [facility] ER visits for sharp atypical chest pain symptoms  · CT angiogram negative for pulmonary embolus/chest pathology  · Chest pain symptoms have not been responsive to indomethacin/tramadol/aspirin therapy  · Echo (10/20/16): Normal LVEF.  No valvular abnormality.  No pericardial effusion.  · CT chest (11/27/16): No pulmonary embolus.  · Exercise echo (11/18/16): No clinical, EKG or echo evidence of ischemia. No intracavitary obliteration  · CT coronary angiogram (11/30/17): Normal coronary anatomy and no atherosclerosis present.  CAC score = 0.  · [facility] ER presentation with atypical chest pain, near-syncope, and mild troponin elevation, 7/8/18.         Current Assessment & Plan     This patient has been intriguing and difficult to diagnose over the last several years. He continues to have nearly daily atypical chest pain symptoms and on occasion exertional shortness of breath and near syncope. His most recent ER visit demonstrated mild troponin elevation in the setting of normal coronary arteries from a CT angiogram performed last year. I am suspicious of myocardial inflammation as the etiology to his symptoms, although I have had no  other objective findings such as diffuse ST elevation on EKG or pericardial rub. I believe this patient deserves cardiac MR evaluation for myocardial inflammation/ischemia.         Relevant Orders    MRI Cardiac Function Complete With & Without Morphology    Syncope and collapse    Overview     · Echo (10/20/16): LVEF 55%. No valvular abnormalities  · Normal stress echo, 11/28/16         Relevant Orders    MRI Cardiac Function Complete With & Without Morphology               · Cardiac MR to evaluate for chronic myocarditis/myocardial inflammation  · Obtain office visit note and echo report from [facility] circa 2016  Return in about 6 months (around 1/10/2019).      Marcus Bell IV, MD  7/10/2018     Scribed for Marcus Bell IV, MD by Kyrie Hopkins. 7/10/2018  5:15 PM

## 2018-08-13 NOTE — PROGRESS NOTES
Subjective   Pool Yang is a 18 y.o. male.     Pt states that for the last two days he has had pain in his left ear. He has been swimming 3 and 4 days ago.       Earache    There is pain in the right ear. This is a new problem. The current episode started in the past 7 days. The problem occurs constantly. The problem has been gradually worsening. There has been no fever. The pain is moderate. Associated symptoms include neck pain (having jaw pain on left side). Pertinent negatives include no abdominal pain, coughing, diarrhea, ear discharge, headaches, hearing loss, rhinorrhea, sore throat or vomiting. He has tried nothing for the symptoms. The treatment provided no relief. There is no history of a chronic ear infection, hearing loss or a tympanostomy tube.        Current Outpatient Prescriptions on File Prior to Visit   Medication Sig Dispense Refill   • cyclobenzaprine (FLEXERIL) 10 MG tablet Take 1 tablet by mouth 3 (Three) Times a Day As Needed (for muscle pain). 15 tablet 0     No current facility-administered medications on file prior to visit.        No Known Allergies    Past Medical History:   Diagnosis Date   • ADHD (attention deficit hyperactivity disorder)    • Hernia of abdominal wall    • Hypertension    • Seizures (CMS/HCC)    • Syncope        Past Surgical History:   Procedure Laterality Date   • HERNIA REPAIR         Family History   Problem Relation Age of Onset   • Arthritis Mother    • Other Mother         cardiac disorder   • Depression Mother    • Diabetes Mother    • Hypertension Mother    • Hyperlipidemia Mother    • Thyroid disease Mother    • Cancer Maternal Grandmother    • Cancer Maternal Grandfather    • Cancer Paternal Grandmother    • Cancer Paternal Grandfather        Social History     Social History   • Marital status: Single     Spouse name: N/A   • Number of children: N/A   • Years of education: N/A     Occupational History   • Not on file.     Social History Main Topics  "  • Smoking status: Never Smoker   • Smokeless tobacco: Never Used   • Alcohol use No   • Drug use: No   • Sexual activity: Defer     Other Topics Concern   • Not on file     Social History Narrative    [facility]       Review of Systems   Constitutional: Negative for activity change, appetite change, chills, diaphoresis, fatigue and fever.   HENT: Positive for ear pain, sinus pain and sinus pressure. Negative for congestion, ear discharge, hearing loss, mouth sores, rhinorrhea, sore throat, trouble swallowing and voice change. Facial swelling: jaw pain.    Eyes: Negative for pain, discharge, redness and itching.   Respiratory: Negative for cough, chest tightness and shortness of breath.    Cardiovascular: Negative for chest pain.   Gastrointestinal: Negative for abdominal pain, diarrhea, nausea and vomiting.   Musculoskeletal: Positive for neck pain (having jaw pain on left side). Negative for arthralgias and myalgias.   Neurological: Negative for dizziness and headaches.   Psychiatric/Behavioral: Negative for agitation.       /88 (BP Location: Left arm, Patient Position: Sitting, Cuff Size: Adult)   Pulse 78   Temp 98.3 °F (36.8 °C) (Oral)   Ht 182.9 cm (72\")   Wt 98 kg (216 lb)   SpO2 99%   BMI 29.29 kg/m²     Objective   Physical Exam   Constitutional: He is oriented to person, place, and time. He appears well-developed and well-nourished. He is cooperative. He appears ill.   HENT:   Head: Normocephalic and atraumatic.   Right Ear: Tympanic membrane, external ear and ear canal normal.   Left Ear: Tympanic membrane normal. There is swelling and tenderness.   Ears:    Nose: Right sinus exhibits no maxillary sinus tenderness and no frontal sinus tenderness. Left sinus exhibits maxillary sinus tenderness. Left sinus exhibits no frontal sinus tenderness.   Mouth/Throat: Uvula is midline, oropharynx is clear and moist and mucous membranes are normal. No posterior oropharyngeal erythema.   Eyes: " Conjunctivae and lids are normal.   Cardiovascular: Normal heart sounds.    Pulmonary/Chest: Effort normal and breath sounds normal.   Lymphadenopathy:     He has cervical adenopathy.   Neurological: He is alert and oriented to person, place, and time.   Skin: Skin is warm and dry. No rash noted.   Psychiatric: He has a normal mood and affect. His speech is normal and behavior is normal.       Procedures None    Assessment/Plan   Pool was seen today for earache.    Diagnoses and all orders for this visit:    Acute swimmer's ear of left side  -     ciprofloxacin-hydrocortisone (CIPRO HC) 0.2-1 % otic suspension; Administer 3 drops into the left ear 2 (Two) Times a Day for 7 days.        Results for orders placed or performed during the hospital encounter of 07/24/18   Comprehensive Metabolic Panel   Result Value Ref Range    Glucose 98 70 - 100 mg/dL    BUN 10 9 - 23 mg/dL    Creatinine 1.14 0.60 - 1.30 mg/dL    Sodium 140 132 - 146 mmol/L    Potassium 3.7 3.5 - 5.5 mmol/L    Chloride 107 99 - 109 mmol/L    CO2 28.0 20.0 - 31.0 mmol/L    Calcium 9.7 8.7 - 10.4 mg/dL    Total Protein 7.2 5.7 - 8.2 g/dL    Albumin 4.49 3.20 - 4.80 g/dL    ALT (SGPT) 35 7 - 40 U/L    AST (SGOT) 26 0 - 33 U/L    Alkaline Phosphatase 148 (H) 25 - 100 U/L    Total Bilirubin 0.6 0.3 - 1.2 mg/dL    eGFR  African Amer 99 >60 mL/min/1.73    Globulin 2.7 gm/dL    A/G Ratio 1.7 1.5 - 2.5 g/dL    BUN/Creatinine Ratio 8.8 7.0 - 25.0    Anion Gap 5.0 3.0 - 11.0 mmol/L   Lipase   Result Value Ref Range    Lipase 29 6 - 51 U/L   BNP   Result Value Ref Range    BNP <2.0 0.0 - 100.0 pg/mL   CBC Auto Differential   Result Value Ref Range    WBC 4.60 4.50 - 13.50 10*3/mm3    RBC 5.34 4.20 - 5.76 10*6/mm3    Hemoglobin 15.0 13.1 - 17.5 g/dL    Hematocrit 44.1 38.9 - 50.9 %    MCV 82.6 80.0 - 99.0 fL    MCH 28.1 27.0 - 31.0 pg    MCHC 34.0 32.0 - 36.0 g/dL    RDW 12.9 11.3 - 14.5 %    RDW-SD 38.7 37.0 - 54.0 fl    MPV 8.7 6.0 - 12.0 fL    Platelets 182  150 - 450 10*3/mm3    Neutrophil % 61.5 41.0 - 71.0 %    Lymphocyte % 30.7 24.0 - 44.0 %    Monocyte % 5.9 0.0 - 12.0 %    Eosinophil % 1.3 0.0 - 3.0 %    Basophil % 0.4 0.0 - 1.0 %    Immature Grans % 0.2 0.0 - 0.6 %    Neutrophils, Absolute 2.83 1.50 - 8.30 10*3/mm3    Lymphocytes, Absolute 1.41 0.60 - 4.80 10*3/mm3    Monocytes, Absolute 0.27 0.00 - 1.00 10*3/mm3    Eosinophils, Absolute 0.06 0.00 - 0.30 10*3/mm3    Basophils, Absolute 0.02 0.00 - 0.20 10*3/mm3    Immature Grans, Absolute 0.01 0.00 - 0.03 10*3/mm3   Sedimentation Rate   Result Value Ref Range    Sed Rate <1 0 - 15 mm/hr   C-reactive Protein   Result Value Ref Range    C-Reactive Protein 0.08 0.00 - 1.00 mg/dL   CK   Result Value Ref Range    Creatine Kinase 226 (H) 26 - 174 U/L   TSH   Result Value Ref Range    TSH 0.871 0.350 - 5.350 mIU/mL   HIV-1 / O / 2 Ag / Antibody 4th Generation   Result Value Ref Range    HIV-1/ HIV-2 Non-Reactive Non-Reactive   POC Troponin, Rapid   Result Value Ref Range    Troponin I 0.34 (H) 0.00 - 0.07 ng/mL   POC Troponin, Rapid   Result Value Ref Range    Troponin I 0.38 (H) 0.00 - 0.07 ng/mL   Light Blue Top   Result Value Ref Range    Extra Tube hold for add-on    Green Top (Gel)   Result Value Ref Range    Extra Tube Hold for add-ons.    Lavender Top   Result Value Ref Range    Extra Tube hold for add-on        Follow up with PCP or go to the nearest emergency room if symptoms worsen or fail to improve.

## 2018-08-13 NOTE — PATIENT INSTRUCTIONS
Ear Drops, Adult  Your doctor has found that you have a condition that requires you to use ear drops. Ear drops are a medicine that is placed in the ear. This sheet gives you information about how to use this medicine. Your doctor may also give you more specific instructions.  Supplies needed:  · Cotton ball.  · Medicine.  How to put ear drops into your ear  1. Wash your hands with soap and water.  2. Make sure your ears are clean and dry.  3. Warm the medicine by holding it in your hand for a few minutes.  4. Shake the medicine to mix the ear drops.  5. Use the tube to get the medicine. You will need to squeeze the round part of the tube to do this.  6. Put the drops in your ear as told. Hold the tube above your ear. Do not let the tube touch your ear. The medicine may go in easier if you pull the flap of your ear up and back while you put the drops in.  7. To make sure your ear soaks up the medicine, do one of these things:  ? Lie down for 10 minutes. The ear with the medicine should face up.  ? Put a cotton ball in your ear. Do not push it deeper into your ear. Take out the cotton ball when the drops have been soaked up.  8. If you need to put drops in your other ear, repeat the same steps. Your doctor will tell you if you should put drops in both ears.  Follow these instructions at home:  · Use the ear drops for as long as your doctor tells you to. Do not stop even if your symptoms get better.  · Keep the ear drops at room temperature.  · Keep all follow-up visits as told by your doctor. This is important.  Contact a doctor if:  · Your condition gets worse.  · Your pain gets worse.  · Unusual fluid (drainage) is coming from your ear, especially if the fluid stinks.  · You have trouble hearing.  Get help right away if:  · You feel like the room is spinning and you feel sick to your stomach. This condition is called vertigo.  · The outside of your ear becomes red or swollen.  · You have a very bad  "headache.  Summary  · Ear drops are a medicine that is put in the ear.  · Put the drops in your ear as told by your doctor.  · Use the ear drops for as long as your doctor tells you to. Do not stop even if your symptoms get better.  This information is not intended to replace advice given to you by your health care provider. Make sure you discuss any questions you have with your health care provider.  Document Released: 06/07/2011 Document Revised: 12/22/2017 Document Reviewed: 12/22/2017  [employer] Patient Education © 2017 [employer]    Otitis Externa  Otitis externa is an infection of the outer ear canal. The outer ear canal is the area between the outside of the ear and the eardrum. Otitis externa is sometimes called \"swimmer's ear.\"  What are the causes?  This condition may be caused by:  · Swimming in dirty water.  · Moisture in the ear.  · An injury to the inside of the ear.  · An object stuck in the ear.  · A cut or scrape on the outside of the ear.    What increases the risk?  This condition is more likely to develop in swimmers.  What are the signs or symptoms?  The first symptom of this condition is often itching in the ear. Later signs and symptoms include:  · Swelling of the ear.  · Redness in the ear.  · Ear pain. The pain may get worse when you pull on your ear.  · Pus coming from the ear.    How is this diagnosed?  This condition may be diagnosed by examining the ear and testing fluid from the ear for bacteria and funguses.  How is this treated?  This condition may be treated with:  · Antibiotic ear drops. These are often given for 10-14 days.  · Medicine to reduce itching and swelling.    Follow these instructions at home:  · If you were prescribed antibiotic ear drops, apply them as told by your health care provider. Do not stop using the antibiotic even if your condition improves.  · Take over-the-counter and prescription medicines only as told by your health care provider.  · Keep " all follow-up visits as told by your health care provider. This is important.  How is this prevented?  · Keep your ear dry. Use the corner of a towel to dry your ear after you swim or bathe.  · Avoid scratching or putting things in your ear. Doing these things can damage the ear canal or remove the protective wax that lines it, which makes it easier for bacteria and funguses to grow.  · Avoid swimming in lakes, polluted water, or pools that may not have the right amount of chlorine.  · Consider making ear drops and putting 3 or 4 drops in each ear after you swim. Ask your health care provider about how you can make ear drops.  Contact a health care provider if:  · You have a fever.  · After 3 days your ear is still red, swollen, painful, or draining pus.  · Your redness, swelling, or pain gets worse.  · You have a severe headache.  · You have redness, swelling, pain, or tenderness in the area behind your ear.  This information is not intended to replace advice given to you by your health care provider. Make sure you discuss any questions you have with your health care provider.  Document Released: 12/18/2006 Document Revised: 01/24/2017 Document Reviewed: 09/26/2016  [employer] Interactive Patient Education © 2018 [employer]    Ear Drops, Adult  Your doctor has found that you have a condition that requires you to use ear drops. Ear drops are a medicine that is placed in the ear. This sheet gives you information about how to use this medicine. Your doctor may also give you more specific instructions.  Supplies needed:  · Cotton ball.  · Medicine.  How to put ear drops into your ear  9. Wash your hands with soap and water.  10. Make sure your ears are clean and dry.  11. Warm the medicine by holding it in your hand for a few minutes.  12. Shake the medicine to mix the ear drops.  13. Use the tube to get the medicine. You will need to squeeze the round part of the tube to do this.  14. Put the drops in your ear as  told. Hold the tube above your ear. Do not let the tube touch your ear. The medicine may go in easier if you pull the flap of your ear up and back while you put the drops in.  15. To make sure your ear soaks up the medicine, do one of these things:  ? Lie down for 10 minutes. The ear with the medicine should face up.  ? Put a cotton ball in your ear. Do not push it deeper into your ear. Take out the cotton ball when the drops have been soaked up.  16. If you need to put drops in your other ear, repeat the same steps. Your doctor will tell you if you should put drops in both ears.  Follow these instructions at home:  · Use the ear drops for as long as your doctor tells you to. Do not stop even if your symptoms get better.  · Keep the ear drops at room temperature.  · Keep all follow-up visits as told by your doctor. This is important.  Contact a doctor if:  · Your condition gets worse.  · Your pain gets worse.  · Unusual fluid (drainage) is coming from your ear, especially if the fluid stinks.  · You have trouble hearing.  Get help right away if:  · You feel like the room is spinning and you feel sick to your stomach. This condition is called vertigo.  · The outside of your ear becomes red or swollen.  · You have a very bad headache.  Summary  · Ear drops are a medicine that is put in the ear.  · Put the drops in your ear as told by your doctor.  · Use the ear drops for as long as your doctor tells you to. Do not stop even if your symptoms get better.  This information is not intended to replace advice given to you by your health care provider. Make sure you discuss any questions you have with your health care provider.  Document Released: 06/07/2011 Document Revised: 12/22/2017 Document Reviewed: 12/22/2017  [employer] Interactive Patient Education © 2017 [employer]    --Please utilize tylenol or ibuprofen as needed for fever or pain. Use as recommended.

## 2018-10-14 NOTE — ED PROVIDER NOTES
"Subjective   Mr. Pool Yang is a 18 year old male who presents to the ED with c/o chest pain and shortness of breath. Patient reports that he was driving this evening around 1700 when he suddenly developed a sharp, \"10/10\" pain in his left chest. States he had to pull over as he became acutely nauseated and felt near-syncopal. He called his friend, who helped transport him to the ED. In the emergency room, patient continues to endorse a sharp pain in his chest. Again rates it a \"10/10\" and states it feels difficult to breathe. Does feel nauseated but the near-syncopal feeling has passed. He reports the pain worsens with deep breathing and movement.     Of note, patient has a hx of atypical chest pain and an elevated troponin (please see ED note by Dr. Figueroa on 7/24). States he has experienced intermittent chest pains for nearly the past 14 years. He has been evaluated multiple times in the past by cardiology without definitive results. Denies a hx of myocardial infarction, transient ischemic attack, or cerebrovascular accident. Does have a hx of hypertension. Non-smoker. Denies drug or alcohol use.        History provided by:  Patient  Chest Pain   Pain location:  L chest  Pain quality: sharp    Pain radiates to:  Does not radiate  Pain severity:  Severe (\"10/10\")  Onset quality:  Sudden  Duration:  3 hours  Timing:  Constant  Progression:  Unchanged  Chronicity:  New  Context: at rest    Relieved by:  Nothing  Worsened by:  Nothing  Associated symptoms: nausea, near-syncope and shortness of breath    Associated symptoms: no cough, no fever, no syncope and no vomiting    Risk factors: hypertension and male sex    Risk factors: not obese and no smoking        Review of Systems   Constitutional: Negative for fever.   HENT: Negative for congestion and sore throat.    Respiratory: Positive for shortness of breath. Negative for cough.    Cardiovascular: Positive for chest pain and near-syncope. Negative for " syncope.   Gastrointestinal: Positive for nausea. Negative for vomiting.   All other systems reviewed and are negative.      Past Medical History:   Diagnosis Date   • ADHD (attention deficit hyperactivity disorder)    • Hernia of abdominal wall    • Hypertension    • Seizures (CMS/HCC)    • Syncope        No Known Allergies    Past Surgical History:   Procedure Laterality Date   • HERNIA REPAIR         Family History   Problem Relation Age of Onset   • Arthritis Mother    • Other Mother         cardiac disorder   • Depression Mother    • Diabetes Mother    • Hypertension Mother    • Hyperlipidemia Mother    • Thyroid disease Mother    • Cancer Maternal Grandmother    • Cancer Maternal Grandfather    • Cancer Paternal Grandmother    • Cancer Paternal Grandfather        Social History     Social History   • Marital status: Single     Social History Main Topics   • Smoking status: Never Smoker   • Smokeless tobacco: Never Used   • Alcohol use No   • Drug use: No   • Sexual activity: Defer     Other Topics Concern   • Not on file     Social History Narrative    [facility]         Objective   Physical Exam   Constitutional: He is oriented to person, place, and time. He appears well-developed and well-nourished. No distress.   HENT:   Head: Normocephalic and atraumatic.   Eyes: Conjunctivae are normal. No scleral icterus.   Neck: Normal range of motion. Neck supple. No JVD present.   No JVD.   Cardiovascular: Normal rate, regular rhythm, normal heart sounds and intact distal pulses.  Exam reveals no gallop and no friction rub.    No murmur heard.  Pulmonary/Chest: Effort normal and breath sounds normal. No respiratory distress. He has no wheezes. He has no rales.   Abdominal: Soft. Bowel sounds are normal. There is no tenderness. There is no guarding.   Musculoskeletal: Normal range of motion. He exhibits no edema.   No pedal edema.   Neurological: He is alert and oriented to person, place, and time.   Skin:  Skin is warm and dry. He is not diaphoretic.   Psychiatric: He has a normal mood and affect. His behavior is normal.   Nursing note and vitals reviewed.      Procedures         ED Course  ED Course as of Oct 15 0025   Sun Oct 14, 2018   2113 Creatine Kinase: (!) 297 [ML]   2132 Troponin I: (!) 0.42 [ML]   Mon Oct 15, 2018   0023 Patient is pain free after evaluation and medication.  REview of records and consultation with Dr. Restrepo; plan of care for the patient to follow up with chest pain clinic tomorrow OR with his cardiologist, Dr. Bell tomorrow. He understands and concurs with this outpatient plan of care  [ML]      ED Course User Index  [ML] Ginette Morales, PONCHO       Recent Results (from the past 24 hour(s))   Comprehensive Metabolic Panel    Collection Time: 10/14/18  8:34 PM   Result Value Ref Range    Glucose 82 70 - 100 mg/dL    BUN 11 9 - 23 mg/dL    Creatinine 0.99 0.60 - 1.30 mg/dL    Sodium 139 132 - 146 mmol/L    Potassium 4.1 3.5 - 5.5 mmol/L    Chloride 105 99 - 109 mmol/L    CO2 29.0 20.0 - 31.0 mmol/L    Calcium 9.8 8.7 - 10.4 mg/dL    Total Protein 7.1 5.7 - 8.2 g/dL    Albumin 4.60 3.20 - 4.80 g/dL    ALT (SGPT) 66 (H) 7 - 40 U/L    AST (SGOT) 33 0 - 33 U/L    Alkaline Phosphatase 143 (H) 25 - 100 U/L    Total Bilirubin 0.7 0.3 - 1.2 mg/dL    eGFR  African Amer 117 >60 mL/min/1.73    Globulin 2.5 gm/dL    A/G Ratio 1.8 1.5 - 2.5 g/dL    BUN/Creatinine Ratio 11.1 7.0 - 25.0    Anion Gap 5.0 3.0 - 11.0 mmol/L   BNP    Collection Time: 10/14/18  8:34 PM   Result Value Ref Range    BNP <2.0 0.0 - 100.0 pg/mL   CK    Collection Time: 10/14/18  8:34 PM   Result Value Ref Range    Creatine Kinase 297 (H) 26 - 174 U/L   Sedimentation Rate    Collection Time: 10/14/18  8:34 PM   Result Value Ref Range    Sed Rate 3 0 - 15 mm/hr   CBC Auto Differential    Collection Time: 10/14/18  8:34 PM   Result Value Ref Range    WBC 7.76 4.50 - 13.50 10*3/mm3    RBC 5.55 4.20 - 5.76 10*6/mm3    Hemoglobin  "15.6 13.1 - 17.5 g/dL    Hematocrit 45.5 38.9 - 50.9 %    MCV 82.0 80.0 - 99.0 fL    MCH 28.1 27.0 - 31.0 pg    MCHC 34.3 32.0 - 36.0 g/dL    RDW 12.8 11.3 - 14.5 %    RDW-SD 38.4 37.0 - 54.0 fl    MPV 8.9 6.0 - 12.0 fL    Platelets 220 150 - 450 10*3/mm3    Neutrophil % 65.5 41.0 - 71.0 %    Lymphocyte % 29.3 24.0 - 44.0 %    Monocyte % 4.6 0.0 - 12.0 %    Eosinophil % 0.5 0.0 - 3.0 %    Basophil % 0.1 0.0 - 1.0 %    Immature Grans % 0.3 0.0 - 0.6 %    Neutrophils, Absolute 5.08 1.50 - 8.30 10*3/mm3    Lymphocytes, Absolute 2.27 0.60 - 4.80 10*3/mm3    Monocytes, Absolute 0.36 0.00 - 1.00 10*3/mm3    Eosinophils, Absolute 0.04 0.00 - 0.30 10*3/mm3    Basophils, Absolute 0.01 0.00 - 0.20 10*3/mm3    Immature Grans, Absolute 0.02 0.00 - 0.03 10*3/mm3   POC Troponin, Rapid    Collection Time: 10/14/18  8:34 PM   Result Value Ref Range    Troponin I 0.42 (H) 0.00 - 0.07 ng/mL   C-reactive Protein    Collection Time: 10/14/18  8:40 PM   Result Value Ref Range    C-Reactive Protein 0.22 0.00 - 1.00 mg/dL   POC Troponin, Rapid    Collection Time: 10/14/18 10:41 PM   Result Value Ref Range    Troponin I 0.41 (H) 0.00 - 0.07 ng/mL     Note: In addition to lab results from this visit, the labs listed above may include labs taken at another facility or during a different encounter within the last 24 hours. Please correlate lab times with ED admission and discharge times for further clarification of the services performed during this visit.    XR Chest 2 View   Final Result   Clear lungs.       THIS DOCUMENT HAS BEEN ELECTRONICALLY SIGNED BY PJ BELL MD        Vitals:    10/14/18 1914 10/14/18 2159 10/14/18 2201   BP: 146/88 133/80    BP Location: Left arm     Patient Position: Sitting     Pulse: 66 58 55   Resp: 16     Temp: 97.7 °F (36.5 °C)     TempSrc: Oral     SpO2: 99%  98%   Weight: 95.3 kg (210 lb)     Height: 182.9 cm (72\")       Medications   sodium chloride 0.9 % flush 10 mL (not administered)   sodium chloride " 0.9 % bolus 1,000 mL (1,000 mL Intravenous New Bag 10/14/18 2137)   ketorolac (TORADOL) injection 30 mg (30 mg Intravenous Given 10/14/18 2139)     ECG/EMG Results (last 24 hours)     Procedure Component Value Units Date/Time    ECG 12 Lead [[id number]] Collected:  10/14/18 1919     Updated:  10/14/18 1920                HEART Score (for prediction of 6-week risk of major adverse cardiac event) reviewed and/or performed as part of the patient evaluation and treatment planning process.  The result associated with this review/performance is: 1           MDM    Final diagnoses:   Atypical chest pain   Troponin level elevated   Elevated CK   Chronic myocarditis, unspecified myocarditis type (CMS/HCC)       Documentation assistance provided by gay Zabala.  Information recorded by the scribrené was done at my direction and has been verified and validated by me.     Chetan Zabala  10/14/18 2040       Chetan Zabala  10/14/18 2341       Ginette Morales APRN  10/15/18 0024       Ginette Moralse APRN  10/15/18 0025

## 2018-10-15 NOTE — DISCHARGE INSTRUCTIONS
Anticipate a call from the next day chest pain clinic tomorrow morning. Follow instructions to follow up with the hospital.    Return to the ED immediately for any worsening or concerning symptoms.

## 2018-12-18 NOTE — ED PROVIDER NOTES
Subjective   Pool Yang is a 18 y.o.male who presents to the ED with c/o chest pain with onset this morning. He reports a h/o chest pain since he was 4 years old. He has been seen multiple times over the years and was supposed to follow up with [facility] but never did and is currently followed by his PCP Travis Aranda. He experiences right sided sharp, pressure that feels similar to his chronic chest pain. He occasionally experiences dyspnea but denies any current cough or fevers. He had a cough within the past couple months. He additionally c/o headache but denies any dizziness, syncope, nausea, vomiting, abdominal pain or any additional acute complaints at this time. He states that he was tried on many different medications all with no relief of his chest pains.         History provided by:  Patient  Chest Pain   Pain location:  R chest  Pain quality: pressure and sharp    Pain radiates to:  Does not radiate  Pain severity:  Mild  Onset quality:  Gradual  Duration:  6 hours  Timing:  Constant  Progression:  Unchanged  Chronicity:  Chronic  Relieved by:  Nothing  Worsened by:  Nothing  Ineffective treatments:  Rest (meds)  Associated symptoms: shortness of breath    Associated symptoms: no abdominal pain, no cough, no diaphoresis, no dizziness, no fever, no nausea, no syncope and no vomiting    Risk factors: male sex    Risk factors: not obese        Review of Systems   Constitutional: Negative for diaphoresis and fever.   Respiratory: Positive for shortness of breath. Negative for cough.    Cardiovascular: Positive for chest pain. Negative for syncope.   Gastrointestinal: Negative for abdominal pain, nausea and vomiting.   Neurological: Negative for dizziness and syncope.   All other systems reviewed and are negative.      Past Medical History:   Diagnosis Date   • ADHD (attention deficit hyperactivity disorder)    • Hernia of abdominal wall    • Hypertension    • Seizures (CMS/HCC)    • Syncope         No Known Allergies    Past Surgical History:   Procedure Laterality Date   • HERNIA REPAIR         Family History   Problem Relation Age of Onset   • Arthritis Mother    • Other Mother         cardiac disorder   • Depression Mother    • Diabetes Mother    • Hypertension Mother    • Hyperlipidemia Mother    • Thyroid disease Mother    • Cancer Maternal Grandmother    • Cancer Maternal Grandfather    • Cancer Paternal Grandmother    • Cancer Paternal Grandfather        Social History     Socioeconomic History   • Marital status: Single     Spouse name: Not on file   • Number of children: Not on file   • Years of education: Not on file   • Highest education level: Not on file   Tobacco Use   • Smoking status: Never Smoker   • Smokeless tobacco: Never Used   Substance and Sexual Activity   • Alcohol use: No   • Drug use: No   • Sexual activity: Defer   Social History Narrative    [employer]         Objective   Physical Exam   Constitutional: He is oriented to person, place, and time. He appears well-developed and well-nourished. No distress.   HENT:   Head: Normocephalic and atraumatic.   Right Ear: External ear normal.   Left Ear: External ear normal.   Nose: Nose normal.   Eyes: Conjunctivae are normal. No scleral icterus.   Neck: Normal range of motion. Neck supple.   Cardiovascular: Normal rate, regular rhythm and normal heart sounds. Exam reveals no friction rub.   No murmur heard.  Pulmonary/Chest: Effort normal and breath sounds normal. No respiratory distress. He has no wheezes. He has no rales.   Abdominal: Soft. Bowel sounds are normal. He exhibits no distension. There is no tenderness.   Musculoskeletal: Normal range of motion. He exhibits no edema or tenderness.   Neurological: He is alert and oriented to person, place, and time.   Skin: Skin is warm and dry. He is not diaphoretic.   Psychiatric: He has a normal mood and affect. His behavior is normal.   Nursing note and vitals  reviewed.      Procedures         ED Course     EKG NSR.  CXR normal.    I reviewed notes from several prior visits.  Pt has been worked up extensively - labs, XRs, stress testing, etc - and no cause has been found.  He is young and healthy with benign vital signs and normal exam and has symptoms c/w pain he has had intermittently since age 4, and I don't see the value of extensive ED testing today when it has been unyielding every previous time.  He has previously been referred to [place] and he should talk to PCP about pursuing more intensive workup for this issue.  Patient stable on serial rechecks.  Discussed findings, concerns, plan of care, expected course, reasons to return and followup.                  MDM  Number of Diagnoses or Management Options  Atypical chest pain:      Amount and/or Complexity of Data Reviewed  Clinical lab tests: reviewed and ordered  Tests in the radiology section of CPT®: reviewed and ordered  Independent visualization of images, tracings, or specimens: yes        Final diagnoses:   Atypical chest pain       Documentation assistance provided by gay Peoples.  Information recorded by the scribe was done at my direction and has been verified and validated by me.     Chetan Peoples  12/18/18 1241       Len Gutierrez MD  12/18/18 8667

## 2019-01-01 ENCOUNTER — DOCUMENTATION (OUTPATIENT)
Dept: CARDIOLOGY | Facility: CLINIC | Age: 19
End: 2019-01-01

## 2019-05-06 NOTE — PROGRESS NOTES
Subjective   Pool Yang is a 17 y.o. male.     History of Present Illness     Chest pain   Patient says that he has had another episode of chest pain.  Patient has been to the emergency room multiple times for chest pain and has been seen by cardiologist for a thorough workup and still etiology has not been found for the chest pain.    Today patient has not had any symptoms of the chest pain, shortness of breath, no dyspnea on exertion, no cough, no fever, no chills, no lower extremity swelling, no other focal symptoms or signs     Review of Systems   All other systems reviewed and are negative.      Objective   Physical Exam   Constitutional: He is oriented to person, place, and time. He appears well-developed and well-nourished.   HENT:   Head: Normocephalic.   Right Ear: External ear normal.   Left Ear: External ear normal.   Nose: Nose normal.   Mouth/Throat: Oropharynx is clear and moist.   Eyes: Conjunctivae and EOM are normal. Pupils are equal, round, and reactive to light.   Neck: Normal range of motion. Neck supple.   Cardiovascular: Normal rate, regular rhythm, normal heart sounds and intact distal pulses.    Pulmonary/Chest: Effort normal and breath sounds normal.   Musculoskeletal: Normal range of motion.   Neurological: He is alert and oriented to person, place, and time. He has normal reflexes.   Nursing note and vitals reviewed.      Assessment/Plan   Pool was seen today for follow-up.    Diagnoses and all orders for this visit:    Chest pain, unspecified type    Atypical chest pain     At this point I am really out of options and possible etiologies for patient's chest pain.  Patient has been seen by cardiology and has received a thorough workup.  There are plus tox of a possible cardiac catheterization for interventional assessment.  I will discuss with cardiology to see what the next step is for this patient.    The pulmonary consult was suggested and although I don't think this is a  likely etiology I am going to make the referral to see if there are other assessments or possible causes for his chest pain, chest wall pain,    Consider pulmonary for evaluation of the lungs.         Carmel Walker(Attending)

## 2019-05-17 NOTE — ED PROVIDER NOTES
Subjective   HPI Comments: 17-year-old male, otherwise healthy, presents for evaluation of chest pain.  He states that he has been experiencing chest pain intermittently for the past 13 years.  The patient states that he is otherwise healthy and has no significant medical problems.  He is unsure as to what may have triggered his symptoms tonight but states that for the past few hours, he has been experiencing intermittent right-sided chest pressure that is nonradiating and 10 out of 10 in severity.  No aggravating or alleviating factors.  No accompanying shortness of breath, vomiting, or diaphoresis.  He states that he is experiencing intermittent dizzy spells as well.  No family history of sudden cardiac death.    Patient is a 17 y.o. male presenting with chest pain.   History provided by:  Patient  Chest Pain   Pain location:  R chest  Pain quality: pressure    Pain radiates to:  Does not radiate  Pain severity:  Moderate  Onset quality:  Sudden  Timing:  Constant  Progression:  Worsening  Chronicity:  Recurrent  Relieved by:  None tried  Worsened by:  Nothing  Ineffective treatments:  None tried  Associated symptoms: dizziness        Review of Systems   Cardiovascular: Positive for chest pain.   Neurological: Positive for dizziness.   All other systems reviewed and are negative.      Past Medical History:   Diagnosis Date   • ADHD (attention deficit hyperactivity disorder)    • Hypertension    • Seizures    • Syncope        No Known Allergies    No past surgical history on file.    Family History   Problem Relation Age of Onset   • Arthritis Mother    • Other Mother      cardiac disorder   • Depression Mother    • Diabetes Mother    • Hypertension Mother    • Hyperlipidemia Mother    • Thyroid disease Mother        Social History     Social History   • Marital status: Single     Social History Main Topics   • Smoking status: Never Smoker   • Smokeless tobacco: Never Used   • Alcohol use No   • Drug use: No   •  Sexual activity: Defer     Social History Narrative    [facility]         Objective   Physical Exam   Constitutional: He is oriented to person, place, and time. He appears well-developed and well-nourished. No distress.   Well-appearing male in no acute distress   HENT:   Head: Normocephalic and atraumatic.   Mouth/Throat: Oropharynx is clear and moist.   Eyes: Pupils are equal, round, and reactive to light.   Neck: Normal range of motion. No JVD present.   Cardiovascular: Normal rate, regular rhythm and normal heart sounds.  Exam reveals no gallop and no friction rub.    No murmur heard.  Pulmonary/Chest: Effort normal and breath sounds normal. No respiratory distress. He has no wheezes. He has no rales.   Abdominal: Soft. Bowel sounds are normal. He exhibits no distension and no mass. There is no tenderness. There is no guarding.   Musculoskeletal: Normal range of motion. He exhibits no edema.   Neurological: He is alert and oriented to person, place, and time. No cranial nerve deficit. Coordination normal.   Normal gait, 5 out of 5 strength in all fours, neurovascularly intact distally in all fours   Skin: Skin is warm and dry. No rash noted. He is not diaphoretic. No erythema. No pallor.   Psychiatric: He has a normal mood and affect. Judgment and thought content normal.   Nursing note and vitals reviewed.      Procedures         ED Course  ED Course   Comment By Time   17-year-old male, otherwise healthy, presents for evaluation of intermittent right-sided chest pain and dizziness this evening.  He states that he has been experiencing chest pain intermittently for the past 13 years and that tonight's episode is no different than priors.  On arrival to the ED, patient very well-appearing with benign exam and reassuring vital signs.  We will obtain an EKG, labs, and a chest x-ray and reevaluate after medications. Edilson Pabon MD 03/03 4127   Initial EKG revealed sinus bradycardia with a heart  "rate of 51 and no ST segments suggestive of or concerning for ischemia. Edilson Pabon MD 03/03 2250   Chem 8 unrevealing. Edilson Pabon MD 03/03 2307   Chest x-ray negative.  Upon reevaluation, patient much improved.  Reassured and counseled regarding symptomatic treatment of likely musculoskeletal chest pain.  He will follow-up with his primary care physician within one week.  Agreeable with plan and given appropriate return precautions. Edilson Pabon MD 03/04 0003   Low risk Well's and PERC negative.  Doubt ACS, PE, dissection, or emergent cardiothoracic process at this time based on exam, history, clinical presentation, gestalt, and risk stratification. Edilson Pabon MD 03/04 0004       No results found for this or any previous visit (from the past 24 hour(s)).  Note: In addition to lab results from this visit, the labs listed above may include labs taken at another facility or during a different encounter within the last 24 hours. Please correlate lab times with ED admission and discharge times for further clarification of the services performed during this visit.    XR Chest 1 View    (Results Pending)     Vitals:    03/03/18 2235   BP: 137/79   Pulse: 63   Resp: 20   Temp: 98.5 °F (36.9 °C)   SpO2: 98%   Weight: 93 kg (205 lb)   Height: 182.9 cm (72\")     Medications - No data to display  ECG/EMG Results (last 24 hours)     ** No results found for the last 24 hours. **                  Recent Results (from the past 24 hour(s))   POC CHEM 8    Collection Time: 03/03/18 10:59 PM   Result Value Ref Range    Glucose 92 70 - 130 mg/dL    BUN, Arterial 7 (L) 8 - 26 mg/dL    Creatinine 0.90 0.60 - 1.30 mg/dL    Sodium 139 138 - 146 mmol/L    Potassium 3.8 3.5 - 4.9 mmol/L    Chloride 102 98 - 109 mmol/L    Total CO2 25 24 - 29 mmol/L    Hemoglobin 15.0 12.0 - 17.0 g/dL    Hematocrit 44 38 - 51 %    Ionized Calcium 1.22 1.20 - 1.32 mmol/L     Note: In addition to lab results from this visit, " "the labs listed above may include labs taken at another facility or during a different encounter within the last 24 hours. Please correlate lab times with ED admission and discharge times for further clarification of the services performed during this visit.    XR Chest 1 View   Final Result     Normal chest x-ray.      THIS DOCUMENT HAS BEEN ELECTRONICALLY SIGNED BY ISRAEL POWELL MD        Vitals:    03/03/18 2235   BP: 137/79   Pulse: 63   Resp: 20   Temp: 98.5 °F (36.9 °C)   SpO2: 98%   Weight: 93 kg (205 lb)   Height: 182.9 cm (72\")     Medications   naproxen (NAPROSYN) tablet 500 mg (500 mg Oral Given 3/3/18 2310)     ECG/EMG Results (last 24 hours)     Procedure Component Value Units Date/Time    ECG 12 Lead [[id number]] Collected:  03/03/18 2248     Updated:  03/03/18 2249            [facility]    Final diagnoses:   Atypical chest pain   Dizziness       Documentation assistance provided by gay Thomas.  Information recorded by the scribe was done at my direction and has been verified and validated by me.     Paolo Thomas  03/03/18 2244       Edilson Pabon MD  03/04/18 0004       Edilson Pabon MD  03/04/18 0005    " no

## 2019-07-09 ENCOUNTER — PREP FOR SURGERY (OUTPATIENT)
Dept: OTHER | Facility: HOSPITAL | Age: 19
End: 2019-07-09